# Patient Record
Sex: FEMALE | Race: WHITE | HISPANIC OR LATINO | Employment: UNEMPLOYED | URBAN - METROPOLITAN AREA
[De-identification: names, ages, dates, MRNs, and addresses within clinical notes are randomized per-mention and may not be internally consistent; named-entity substitution may affect disease eponyms.]

---

## 2022-05-17 PROBLEM — K21.9 GASTROESOPHAGEAL REFLUX DISEASE WITHOUT ESOPHAGITIS: Status: ACTIVE | Noted: 2022-05-17

## 2022-05-17 PROBLEM — E28.2 PCOS (POLYCYSTIC OVARIAN SYNDROME): Status: ACTIVE | Noted: 2022-05-17

## 2022-05-17 PROBLEM — J45.20 MILD INTERMITTENT ASTHMA WITHOUT COMPLICATION: Status: ACTIVE | Noted: 2022-05-17

## 2022-06-14 PROBLEM — J02.9 SORE THROAT: Status: ACTIVE | Noted: 2022-06-14

## 2022-06-14 PROBLEM — R22.43 LOCALIZED SWELLING, MASS, OR LUMP OF LOWER EXTREMITY, BILATERAL: Status: ACTIVE | Noted: 2022-06-14

## 2022-06-17 PROBLEM — R82.71 BACTERIURIA, CHRONIC: Status: ACTIVE | Noted: 2022-06-17

## 2022-08-25 ENCOUNTER — OFFICE VISIT (OUTPATIENT)
Dept: OBGYN CLINIC | Facility: CLINIC | Age: 22
End: 2022-08-25

## 2022-08-25 VITALS
HEIGHT: 61 IN | SYSTOLIC BLOOD PRESSURE: 128 MMHG | HEART RATE: 78 BPM | DIASTOLIC BLOOD PRESSURE: 72 MMHG | WEIGHT: 138 LBS | BODY MASS INDEX: 26.06 KG/M2

## 2022-08-25 DIAGNOSIS — Z31.69 INFERTILITY COUNSELING: ICD-10-CM

## 2022-08-25 DIAGNOSIS — E28.2 PCOS (POLYCYSTIC OVARIAN SYNDROME): ICD-10-CM

## 2022-08-25 DIAGNOSIS — R82.71 BACTERIURIA, CHRONIC: Primary | ICD-10-CM

## 2022-08-25 LAB
SL AMB  POCT GLUCOSE, UA: NORMAL
SL AMB LEUKOCYTE ESTERASE,UA: NORMAL
SL AMB POCT BILIRUBIN,UA: NORMAL
SL AMB POCT BLOOD,UA: NORMAL
SL AMB POCT CLARITY,UA: CLEAR
SL AMB POCT COLOR,UA: YELLOW
SL AMB POCT KETONES,UA: NORMAL
SL AMB POCT NITRITE,UA: NORMAL
SL AMB POCT PH,UA: 7
SL AMB POCT SPECIFIC GRAVITY,UA: 1.02
SL AMB POCT URINE PROTEIN: NORMAL
SL AMB POCT UROBILINOGEN: 0.2

## 2022-08-25 PROCEDURE — 87086 URINE CULTURE/COLONY COUNT: CPT

## 2022-08-25 PROCEDURE — 99213 OFFICE O/P EST LOW 20 MIN: CPT | Performed by: OBSTETRICS & GYNECOLOGY

## 2022-08-25 PROCEDURE — 87147 CULTURE TYPE IMMUNOLOGIC: CPT

## 2022-08-25 PROCEDURE — 81002 URINALYSIS NONAUTO W/O SCOPE: CPT | Performed by: OBSTETRICS & GYNECOLOGY

## 2022-08-25 RX ORDER — LETROZOLE 2.5 MG/1
2.5 TABLET, FILM COATED ORAL DAILY
Qty: 15 TABLET | Refills: 1 | Status: SHIPPED | OUTPATIENT
Start: 2022-08-25

## 2022-08-25 NOTE — PROGRESS NOTES
Clinic Visit   8/25/2022    SUBJECTIVE  HPI: Dante Metcalf is a 20 y o  G0 who presents for follow up of her PCOS, dysmenorrhea, and infertility  She brought a period diary from the last 2 months which showed her periods being about 18 days apart with about 9 days of bleeding and cramping throughout that time  States she has not bled since the 3rd of August and should be due to start soon  She has been sexually active with her partner without any form of birth control  Very much wants to become pregnant and is willing to continue dealing with the menstrual pain to do so  Did become tearful towards the end of our visit  History reviewed  No pertinent past medical history  History reviewed  No pertinent surgical history  Social History     Tobacco Use    Smoking status: Former Smoker    Smokeless tobacco: Never Used   Vaping Use    Vaping Use: Every day    Substances: Nicotine   Substance Use Topics    Alcohol use: Yes    Drug use: Never         Current Outpatient Medications:     letrozole (FEMARA) 2 5 mg tablet, Take 1 tablet (2 5 mg total) by mouth daily Take one tablet per day on days 3-7 of your cycle (day 1 is first day of bleeding) and repeat monthly, Disp: 15 tablet, Rfl: 1    metFORMIN (GLUCOPHAGE) 500 mg tablet, Take 2 tablets (1,000 mg total) by mouth daily with breakfast, Disp: 90 tablet, Rfl: 1    cetirizine (ZyrTEC) 10 mg tablet, Take 1 tablet (10 mg total) by mouth daily (Patient not taking: Reported on 8/25/2022), Disp: 30 tablet, Rfl: 0    famotidine (PEPCID) 20 mg tablet, Take 1 tablet (20 mg total) by mouth in the morning and 1 tablet (20 mg total) in the evening   (Patient not taking: Reported on 8/25/2022), Disp: 60 tablet, Rfl: 0    metFORMIN (GLUCOPHAGE) 500 mg tablet, Take 1 tablet (500 mg total) by mouth daily with breakfast (Patient not taking: Reported on 8/25/2022), Disp: 30 tablet, Rfl: 0      OBJECTIVE:  Vitals:    08/25/22 1144   BP: 128/72 Pulse: 78   Weight: 62 6 kg (138 lb)   Height: 5' 1" (1 549 m)       Physical Exam  GEN: The patient was alert and oriented x3, pleasant well-appearing female in no acute distress     CV: Regular rate  PULM: Non-labored respirations  MSK: Normal gait  Skin: Warm, dry  Neuro: No focal deficits  Psych: Normal affect and judgement, cooperative      ASSESSMENT/PLAN:    Problem List Items Addressed This Visit        Endocrine    PCOS (polycystic ovarian syndrome)     Has not taken her metformin recently, ran out of refills, will increase dose to 1000mg daily and send to pharmacy  No ultrasound done recently and [place], will repeat due to worsening pain         Relevant Medications    metFORMIN (GLUCOPHAGE) 500 mg tablet    Other Relevant Orders    POCT urine dip (Completed)    US pelvis complete w transvaginal    Urine culture       Other    Bacteriuria, chronic - Primary     Repeat urine culture sent today         Relevant Orders    Urine culture    Infertility counseling     SERENITY consult placed however unsure if covered by medical assistance  Will send partner to obtain a semen analysis, paper script given  Trial of letrozole ordered, patient instructed to take 2 5 mg on days 3-7 of her cycle  Plan to follow up in 2 months or sooner if needed         Relevant Medications    letrozole (FEMARA) 2 5 mg tablet    Other Relevant Orders    SEMEN ANALYSIS, Indiana Hebert MD   PGY-2, OBGYN  08/26/22 11:08 AM

## 2022-08-26 PROBLEM — Z31.69 INFERTILITY COUNSELING: Status: ACTIVE | Noted: 2022-08-26

## 2022-08-26 NOTE — ASSESSMENT & PLAN NOTE
Has not taken her metformin recently, ran out of refills, will increase dose to 1000mg daily and send to pharmacy  No ultrasound done recently and no record in this country, will repeat due to worsening pain

## 2022-08-26 NOTE — ASSESSMENT & PLAN NOTE
SERENITY consult placed however unsure if covered by medical assistance  Will send partner to obtain a semen analysis, paper script given  Trial of letrozole ordered, patient instructed to take 2 5 mg on days 3-7 of her cycle  Plan to follow up in 2 months or sooner if needed

## 2022-08-27 LAB
BACTERIA UR CULT: ABNORMAL
BACTERIA UR CULT: ABNORMAL

## 2022-11-24 ENCOUNTER — HOSPITAL ENCOUNTER (EMERGENCY)
Facility: HOSPITAL | Age: 22
Discharge: HOME | End: 2022-11-24
Attending: EMERGENCY MEDICINE

## 2022-11-24 VITALS
OXYGEN SATURATION: 100 % | BODY MASS INDEX: 27.87 KG/M2 | RESPIRATION RATE: 16 BRPM | HEART RATE: 78 BPM | TEMPERATURE: 98.6 F | SYSTOLIC BLOOD PRESSURE: 120 MMHG | WEIGHT: 147.5 LBS | DIASTOLIC BLOOD PRESSURE: 78 MMHG

## 2022-11-24 DIAGNOSIS — F41.9 ANXIETY: Primary | ICD-10-CM

## 2022-11-24 LAB
ATRIAL RATE: 95 BPM
EXT PREGNANCY TEST URINE: NEGATIVE
EXT. CONTROL: NORMAL
P AXIS: 61 DEGREES
PR INTERVAL: 140 MS
QRS AXIS: 64 DEGREES
QRSD INTERVAL: 92 MS
QT INTERVAL: 358 MS
QTC INTERVAL: 449 MS
T WAVE AXIS: 56 DEGREES
VENTRICULAR RATE: 95 BPM

## 2022-11-24 RX ORDER — HYDROXYZINE HYDROCHLORIDE 25 MG/1
25 TABLET, FILM COATED ORAL EVERY 6 HOURS
Qty: 12 TABLET | Refills: 0 | Status: SHIPPED | OUTPATIENT
Start: 2022-11-24

## 2022-11-24 RX ORDER — LORAZEPAM 0.5 MG/1
0.5 TABLET ORAL ONCE
Status: COMPLETED | OUTPATIENT
Start: 2022-11-24 | End: 2022-11-24

## 2022-11-24 RX ADMIN — LORAZEPAM 0.5 MG: 0.5 TABLET ORAL at 17:05

## 2022-11-24 NOTE — Clinical Note
Edda Keene was seen and treated in our emergency department on 11/24/2022  No restrictions            Diagnosis:     Osbelyn    She may return on this date: 11/25/2022         If you have any questions or concerns, please don't hesitate to call        Mickey Stallworth PA-C    ______________________________           _______________          _______________  Hospital Representative                              Date                                Time

## 2022-11-24 NOTE — ED PROVIDER NOTES
History  Chief Complaint   Patient presents with   • Chest Pain     Reports chest pain that started 1 hr PTA  Reports she is nervous and feels like her heart is beating fast       Patient is a 26-year-old female coming in for evaluation of heart palpitations, numbness of fingers and toes, as well as tachycardia  Patient has been feeling this way off and on for the last month  Patient has had several episodes today  Patient has no personal history of cardiac disease, diabetes, hypertension  Patient does not believe she is currently pregnant  On metformin for PCOS      History provided by:  Patient   used: No    Palpitations  Palpitations quality:  Regular  Onset quality:  Sudden  Timing:  Intermittent  Associated symptoms: near-syncope and numbness    Associated symptoms: no chest pain, no diaphoresis, no dizziness, no nausea, no shortness of breath and no vomiting    Risk factors: no diabetes mellitus and no heart disease        Prior to Admission Medications   Prescriptions Last Dose Informant Patient Reported? Taking? cetirizine (ZyrTEC) 10 mg tablet   No No   Sig: Take 1 tablet (10 mg total) by mouth daily   Patient not taking: Reported on 8/25/2022   famotidine (PEPCID) 20 mg tablet   No No   Sig: Take 1 tablet (20 mg total) by mouth in the morning and 1 tablet (20 mg total) in the evening  Patient not taking: Reported on 8/25/2022   letrozole [facility]) 2 5 mg tablet   No No   Sig: Take 1 tablet (2 5 mg total) by mouth daily Take one tablet per day on days 3-7 of your cycle (day 1 is first day of bleeding) and repeat monthly   metFORMIN (GLUCOPHAGE) 500 mg tablet   No No   Sig: Take 1 tablet (500 mg total) by mouth daily with breakfast   Patient not taking: Reported on 8/25/2022   metFORMIN (GLUCOPHAGE) 500 mg tablet   No No   Sig: Take 2 tablets (1,000 mg total) by mouth daily with breakfast      Facility-Administered Medications: None       History reviewed   No pertinent past medical history  History reviewed  No pertinent surgical history  History reviewed  No pertinent family history  I have reviewed and agree with the history as documented  E-Cigarette/Vaping   • E-Cigarette Use Current Every Day User      E-Cigarette/Vaping Substances   • Nicotine Yes      Social History     Tobacco Use   • Smoking status: Former   • Smokeless tobacco: Never   Vaping Use   • Vaping Use: Every day   • Substances: Nicotine   Substance Use Topics   • Alcohol use: Yes   • Drug use: Yes     Types: Marijuana       Review of Systems   Constitutional: Negative  Negative for diaphoresis  HENT: Negative  Eyes: Negative  Respiratory: Negative  Negative for chest tightness and shortness of breath  Cardiovascular: Positive for palpitations and near-syncope  Negative for chest pain  Gastrointestinal: Negative  Negative for abdominal pain, nausea and vomiting  Genitourinary: Negative  Musculoskeletal: Negative  Skin: Negative  Neurological: Positive for numbness  Negative for dizziness  Psychiatric/Behavioral: The patient is nervous/anxious  Physical Exam  Physical Exam  Vitals reviewed  Constitutional:       Appearance: Normal appearance  She is normal weight  HENT:      Head: Normocephalic and atraumatic  Right Ear: External ear normal       Left Ear: External ear normal       Nose: Nose normal    Eyes:      Conjunctiva/sclera: Conjunctivae normal    Cardiovascular:      Rate and Rhythm: Normal rate and regular rhythm  Pulmonary:      Effort: Pulmonary effort is normal       Breath sounds: Normal breath sounds  Abdominal:      Palpations: Abdomen is soft  Tenderness: There is no abdominal tenderness  There is no guarding  Musculoskeletal:         General: Normal range of motion  Cervical back: Normal range of motion  Skin:     General: Skin is warm and dry  Neurological:      Mental Status: She is alert           Vital Signs  ED Triage Vitals Temperature Pulse Respirations Blood Pressure SpO2   11/24/22 1646 11/24/22 1646 11/24/22 1646 11/24/22 1646 11/24/22 1646   98 6 °F (37 °C) (!) 112 20 136/87 100 %      Temp Source Heart Rate Source Patient Position - Orthostatic VS BP Location FiO2 (%)   11/24/22 1646 11/24/22 1705 11/24/22 1646 11/24/22 1646 --   Oral Monitor Lying Left arm       Pain Score       11/24/22 1705       2           Vitals:    11/24/22 1646 11/24/22 1705   BP: 136/87 120/78   Pulse: (!) 112 78   Patient Position - Orthostatic VS: Lying Sitting         Visual Acuity      ED Medications  Medications   LORazepam (ATIVAN) tablet 0 5 mg (0 5 mg Oral Given 11/24/22 1705)       Diagnostic Studies  Results Reviewed     Procedure Component Value Units Date/Time    POCT pregnancy, urine [[id number]]  (Normal) Resulted: 11/24/22 1702    Lab Status: Final result Updated: 11/24/22 1705     EXT Preg Test, Ur Negative     Control Valid                 No orders to display              Procedures  Procedures         ED Course  ED Course as of 11/24/22 1720   Thu Nov 24, 2022   1642 Ventricular rate 95 beats per minute  MS interval 140 milliseconds  QRS duration 92 milliseconds  QT//449 milliseconds  PRT axes 61, 64, 56,    ECG interpretation-"NSR, no ST elevation depression"                                 SBIRT 20yo+    Flowsheet Row Most Recent Value   SBIRT (25 yo +)    In order to provide better care to our patients, we are screening all of our patients for alcohol and drug use  Would it be okay to ask you these screening questions? No Filed at: 11/24/2022 1644                    MDM  Number of Diagnoses or Management Options  Anxiety: new and does not require workup  Diagnosis management comments: Patient is in no acute distress, comes in for evaluation of a panic attack  Patient given Ativan here, discharged home with Atarax, given information for outpatient counseling  No chest pain at this time, no history of heart disease    Patient discharged home with recommended follow-up to PCP  No ischemia or other concerning pathology on EKG    Counseling: I had a detailed discussion with the patient and/or guardian regarding: the historical points, exam findings, and any diagnostic results supporting the discharge diagnosis, lab results, radiology results, discharge instructions reviewed with patient and/or family/caregiver and understanding was verbalized  Instructions given to return to the emergency department if symptoms worsen or persist, or if there are any questions or concerns that arise at home       All labs reviewed and utilized in the medical decision making process     All radiology studies independently viewed by me and interpreted by the radiologist     Portions of the record may have been created with voice recognition software   Occasional wrong word or "sound a like" substitutions may have occurred due to the inherent limitations of voice recognition software   Read the chart carefully and recognize, using context, where substitutions have occurred  Risk of Complications, Morbidity, and/or Mortality  Presenting problems: minimal  Diagnostic procedures: minimal  Management options: minimal    Patient Progress  Patient progress: stable      Disposition  Final diagnoses:   Anxiety     Time reflects when diagnosis was documented in both MDM as applicable and the Disposition within this note     Time User Action Codes Description Comment    11/24/2022  4:56 PM Peter Neely Add [F41 9] Anxiety       ED Disposition     ED Disposition   Discharge    Condition   Stable    Date/Time   Thu Nov 24, 2022  4:56 PM    Comment   Pratima How discharge to home/self care                 Follow-up Information     Follow up With Specialties Details Why Contact Info Additional [facility] [address] Nurse Practitioner   [phone] [facility]  [place] [facility] [address]        [address] [facility] Emergency Medicine  As needed, If symptoms worsen [address] [id number] [address] [facility] Emergency Department          Discharge Medication List as of 11/24/2022  4:57 PM      START taking these medications    Details   hydrOXYzine HCL (ATARAX) 25 mg tablet Take 1 tablet (25 mg total) by mouth every 6 (six) hours, Starting Thu 11/24/2022, Normal         CONTINUE these medications which have NOT CHANGED    Details   cetirizine (ZyrTEC) 10 mg tablet Take 1 tablet (10 mg total) by mouth daily, Starting Tue 6/14/2022, Normal      famotidine (PEPCID) 20 mg tablet Take 1 tablet (20 mg total) by mouth in the morning and 1 tablet (20 mg total) in the evening , Starting Tue 5/17/2022, Normal      letrozole (FEMARA) 2 5 mg tablet Take 1 tablet (2 5 mg total) by mouth daily Take one tablet per day on days 3-7 of your cycle (day 1 is first day of bleeding) and repeat monthly, Starting Thu 8/25/2022, Normal      !! metFORMIN (GLUCOPHAGE) 500 mg tablet Take 1 tablet (500 mg total) by mouth daily with breakfast, Starting Tue 5/17/2022, Normal      !! metFORMIN (GLUCOPHAGE) 500 mg tablet Take 2 tablets (1,000 mg total) by mouth daily with breakfast, Starting Thu 8/25/2022, Normal       !! - Potential duplicate medications found  Please discuss with provider  No discharge procedures on file      PDMP Review     None          ED Provider  Electronically Signed by           Charity De Luna PA-C  11/24/22 7851

## 2022-12-15 ENCOUNTER — OFFICE VISIT (OUTPATIENT)
Dept: OBGYN CLINIC | Facility: CLINIC | Age: 22
End: 2022-12-15

## 2022-12-15 VITALS
HEIGHT: 61 IN | WEIGHT: 134 LBS | DIASTOLIC BLOOD PRESSURE: 82 MMHG | SYSTOLIC BLOOD PRESSURE: 128 MMHG | HEART RATE: 91 BPM | BODY MASS INDEX: 25.3 KG/M2

## 2022-12-15 DIAGNOSIS — Z31.69 INFERTILITY COUNSELING: ICD-10-CM

## 2022-12-15 DIAGNOSIS — E28.2 PCOS (POLYCYSTIC OVARIAN SYNDROME): Primary | ICD-10-CM

## 2022-12-15 NOTE — ASSESSMENT & PLAN NOTE
SERENITY consult placed however unsure if covered by medical assistance  Partner has not yet completed his semen analysis, paper script given  S/p 3 month course of letrozole, will switch to clomid and try another 3 months

## 2022-12-15 NOTE — ASSESSMENT & PLAN NOTE
On metformin 1000 mcg daily  Repeat pelvic US not yet obtained, given number for outpatient radiology to schedule

## 2022-12-15 NOTE — PROGRESS NOTES
Clinic Visit   12/15/2022    SUBJECTIVE  HPI: Jeanie Gandara is a 20 y o  G0 who presents for follow up of her PCOS, dysmenorrhea, and infertility  She reports that her periods have been more regular and she is currently getting them every month  Reports that her dysmenorrhea has continued but not worsened  She has continued to be sexually active with her partner without any form of birth control  History reviewed  No pertinent past medical history  History reviewed  No pertinent surgical history  Social History     Tobacco Use   • Smoking status: Former     Types: Cigarettes   • Smokeless tobacco: Never   Vaping Use   • Vaping Use: Every day   • Substances: Nicotine   Substance Use Topics   • Alcohol use: Yes     Comment: occ   • Drug use: Yes     Types: Marijuana         Current Outpatient Medications:   •  cetirizine (ZyrTEC) 10 mg tablet, Take 1 tablet (10 mg total) by mouth daily, Disp: 30 tablet, Rfl: 0  •  clomiPHENE (CLOMID) 50 mg tablet, Take 2 tabs daily for 5 days starting 5 days after your period Sis 2 pastillas por 5 mac  Sis la primera 5 mac despues del primer pinky de rae periodo  Repeat every month for 3 months  Repite cada mes por 3 meses  , Disp: 30 tablet, Rfl: 0  •  famotidine (PEPCID) 20 mg tablet, Take 1 tablet (20 mg total) by mouth in the morning and 1 tablet (20 mg total) in the evening , Disp: 60 tablet, Rfl: 0  •  hydrOXYzine HCL (ATARAX) 25 mg tablet, Take 1 tablet (25 mg total) by mouth every 6 (six) hours, Disp: 12 tablet, Rfl: 0  •  letrozole (FEMARA) 2 5 mg tablet, Take 1 tablet (2 5 mg total) by mouth daily Take one tablet per day on days 3-7 of your cycle (day 1 is first day of bleeding) and repeat monthly, Disp: 15 tablet, Rfl: 1  •  metFORMIN (GLUCOPHAGE) 500 mg tablet, Take 2 tablets (1,000 mg total) by mouth daily with breakfast, Disp: 90 tablet, Rfl: 1  •  metFORMIN (GLUCOPHAGE) 500 mg tablet, Take 1 tablet (500 mg total) by mouth daily with breakfast (Patient not taking: Reported on 8/25/2022), Disp: 30 tablet, Rfl: 0      OBJECTIVE:  Vitals:    12/15/22 1049   BP: 128/82   Pulse: 91   Weight: 60 8 kg (134 lb)   Height: 5' 1" (1 549 m)       Physical Exam  GEN: The patient was alert and oriented x3, pleasant well-appearing female in no acute distress     CV: Regular rate  PULM: Non-labored respirations  MSK: Normal gait  Skin: Warm, dry  Neuro: No focal deficits  Psych: Normal affect and judgement, cooperative    ASSESSMENT/PLAN:  Problem List Items Addressed This Visit        Endocrine    PCOS (polycystic ovarian syndrome) - Primary     On metformin 1000 mcg daily  Repeat pelvic US not yet obtained, given number for outpatient radiology to schedule            Other    Infertility counseling     SERENITY consult placed however unsure if covered by medical assistance  Partner has not yet completed his semen analysis, paper script given  S/p 3 month course of letrozole, will switch to clomid and try another 3 months         Relevant Medications    clomiPHENE (CLOMID) 50 mg tablet         Twin Sanchez MD   PGY-2, OBGYN  12/15/22 1:21 PM

## 2023-01-25 ENCOUNTER — HOSPITAL ENCOUNTER (OUTPATIENT)
Dept: ULTRASOUND IMAGING | Facility: HOSPITAL | Age: 23
Discharge: HOME | End: 2023-01-25

## 2023-01-25 DIAGNOSIS — E28.2 PCOS (POLYCYSTIC OVARIAN SYNDROME): ICD-10-CM

## 2023-01-25 NOTE — PROGRESS NOTES
Problem Visit   01/26/23     SUBJECTIVE:  HPI: Agus Collins is a 20 y o  Vanita Gums female who presents for follow up of PCOS and infertility  Last period was in November and she did take a course of Letrozole following that bleed but has not been able to since  Reports continued pelvic fullness and discomfort, likely secondary to her enlarged ovaries  Had a US done yesterday which showed innumerable follicles in both ovaries with a normal uterus and 6mm lining  History reviewed  No pertinent surgical history  Social History     Tobacco Use   • Smoking status: Former     Types: Cigarettes   • Smokeless tobacco: Never   Vaping Use   • Vaping Use: Every day   • Substances: Nicotine   Substance Use Topics   • Alcohol use: Not Currently     Comment: occ   • Drug use: Not Currently     Types: Marijuana         Current Outpatient Medications:   •  letrozole (FEMARA) 2 5 mg tablet, Take 2 tablets (5 mg total) by mouth daily for 15 days Take one tablet per day on days 3-7 of your cycle (day 1 is first day of bleeding) and repeat monthly, Disp: 30 tablet, Rfl: 0  •  Prenatal 27-1 MG TABS, Take 1 tablet by mouth in the morning, Disp: 30 tablet, Rfl: 0  •  cetirizine (ZyrTEC) 10 mg tablet, Take 1 tablet (10 mg total) by mouth daily (Patient not taking: Reported on 1/26/2023), Disp: 30 tablet, Rfl: 0  •  famotidine (PEPCID) 20 mg tablet, Take 1 tablet (20 mg total) by mouth in the morning and 1 tablet (20 mg total) in the evening   (Patient not taking: Reported on 1/26/2023), Disp: 60 tablet, Rfl: 0  •  hydrOXYzine HCL (ATARAX) 25 mg tablet, Take 1 tablet (25 mg total) by mouth every 6 (six) hours (Patient not taking: Reported on 1/26/2023), Disp: 12 tablet, Rfl: 0  •  metFORMIN (GLUCOPHAGE) 500 mg tablet, Take 2 tablets (1,000 mg total) by mouth daily with breakfast (Patient not taking: Reported on 1/26/2023), Disp: 90 tablet, Rfl: 1      OBJECTIVE:  Vitals:    01/26/23 0834   BP: 108/69   Pulse: 83 Weight: 60 3 kg (133 lb)   Height: 5' 1" (1 549 m)       Physical Exam  GEN: The patient was alert and oriented x3, pleasant well-appearing female in no acute distress     CV: Regular rate  PULM: Non-labored respirations  MSK: Normal gait  Skin: Warm, dry  Neuro: No focal deficits  Psych: Normal affect and judgement, cooperative      ASSESSMENT/PLAN:  Problem List        Digestive    Gastroesophageal reflux disease without esophagitis       Endocrine    PCOS (polycystic ovarian syndrome)    Overview     · Diagnosed in [facility] with labs and ultrasound, confirmed innumerable cysts on bilateral ovaries seen on repeat US 1/25/23  · Periods are irregular in both frequency and length  · Previously took metformin but no longer, can consider checking insulin level and lipids to determine if it would be beneficial          Current Assessment & Plan     Has not had a period since November, did one cycle of letrozole following that bleed  Urine pregnancy test in the office today was negative, thin EMS seen on [facility] yesterday  Will follow up estradiol/progesterone levels and call patient to start taking the letrozole  Will order repeat progesterone level to be drawn around day 25 and increase the dose to 7 5 mg if still low  If unable to conceive after multiple cycles of this, will then suggest SIS vs HSG to check for tubal patency            Respiratory    Mild intermittent asthma without complication       Genitourinary    Infertility associated with anovulation    Overview     · Have been trying to get pregnant for the last 2 years  · Partner previously resistant to sperm analysis since he has fathered kids in the past, again emphasized that he still must complete it to be sure  · Will try letrozole (see A&P)  · If unsuccessful after increasing to max dose, will then pursue HSG to check for tubal patency           Current Assessment & Plan     - We discussed potential advantages of letrozole over a clomid challenge for PCOS  - Letrozole now considered to be the drug of choice for ovulation induction in women with PCOS  Clomiphene citrate has been the first-line drug for this population for many years, with metformin used as an alternative  However, both clomiphene and metformin appear to be less effective for live birth rates than letrozole  - No direct antiestrogenic adverse effects on the endometrium, due to an absence of peripheral estrogen receptor blockade and the shorter half-life  - She increase the dose of letrozole to 5 mg x 5 days on or about the fifth day of cycle (once confirmed by lab work)      Protocol:  1  Endometrial stripe 6 mm, estradiol and progesterone levels ordered  If within range, will consider this Day 1   2  Take Letrozole as prescribed on Day 3-7 of your cycle  You may have bloating, cramping, nausea due to the stimulation of your ovaries  3  Have intercourse approximately every other day around Day 10-16  For increased accuracy, test for an [facility] surge using an Ovulation Predictor Kit (OPK)  Usually ovulation will take place around day 14  Plan to have intercourse on the day you have a +LH on the OPK  4  Go to the lab on Day 21 for a Progesterone level  This will tell me if you have ovulated  5  Take a pregnancy test on Day 30 if no menses  If positive call, the office for appointment scheduling  If negative and no menses- wait until day 35  If still no menses and pregnancy test is negative call the office  6  If you get a period, the first day of bleeding is Day 1 and the cycle restarts              Other    Sore throat    Localized swelling, mass, or lump of lower extremity, bilateral         Noah Albert MD   PGY-2, OBGYN  01/26/23 10:07 AM

## 2023-01-26 ENCOUNTER — OFFICE VISIT (OUTPATIENT)
Dept: OBGYN CLINIC | Facility: CLINIC | Age: 23
End: 2023-01-26

## 2023-01-26 ENCOUNTER — APPOINTMENT (OUTPATIENT)
Dept: LAB | Facility: CLINIC | Age: 23
End: 2023-01-26

## 2023-01-26 VITALS
HEIGHT: 61 IN | BODY MASS INDEX: 25.11 KG/M2 | DIASTOLIC BLOOD PRESSURE: 69 MMHG | WEIGHT: 133 LBS | SYSTOLIC BLOOD PRESSURE: 108 MMHG | HEART RATE: 83 BPM

## 2023-01-26 DIAGNOSIS — N97.0 INFERTILITY ASSOCIATED WITH ANOVULATION: ICD-10-CM

## 2023-01-26 DIAGNOSIS — N97.0 INFERTILITY ASSOCIATED WITH ANOVULATION: Primary | ICD-10-CM

## 2023-01-26 DIAGNOSIS — E28.2 PCOS (POLYCYSTIC OVARIAN SYNDROME): Primary | ICD-10-CM

## 2023-01-26 DIAGNOSIS — Z31.69 INFERTILITY COUNSELING: ICD-10-CM

## 2023-01-26 PROBLEM — R82.71 BACTERIURIA, CHRONIC: Status: RESOLVED | Noted: 2022-06-17 | Resolved: 2023-01-26

## 2023-01-26 LAB
ESTRADIOL SERPL-MCNC: 49 PG/ML
PROGEST SERPL-MCNC: 1.1 NG/ML
SL AMB POCT URINE HCG: NEGATIVE

## 2023-01-26 RX ORDER — LETROZOLE 2.5 MG/1
5 TABLET, FILM COATED ORAL DAILY
Qty: 30 TABLET | Refills: 0 | Status: SHIPPED | OUTPATIENT
Start: 2023-01-26 | End: 2023-02-10

## 2023-01-26 RX ORDER — MEDROXYPROGESTERONE ACETATE 10 MG/1
10 TABLET ORAL DAILY
Qty: 5 TABLET | Refills: 0 | Status: CANCELLED | OUTPATIENT
Start: 2023-01-26 | End: 2023-01-31

## 2023-01-26 RX ORDER — PRENATAL WITH FERROUS FUM AND FOLIC ACID 3080; 920; 120; 400; 22; 1.84; 3; 20; 10; 1; 12; 200; 27; 25; 2 [IU]/1; [IU]/1; MG/1; [IU]/1; MG/1; MG/1; MG/1; MG/1; MG/1; MG/1; UG/1; MG/1; MG/1; MG/1; MG/1
1 TABLET ORAL DAILY
Qty: 30 TABLET | Refills: 0 | Status: SHIPPED | OUTPATIENT
Start: 2023-01-26

## 2023-01-26 RX ORDER — LETROZOLE 2.5 MG/1
5 TABLET, FILM COATED ORAL DAILY
Qty: 30 TABLET | Refills: 0 | Status: CANCELLED | OUTPATIENT
Start: 2023-01-26

## 2023-01-26 NOTE — ASSESSMENT & PLAN NOTE
Has not had a period since November, did one cycle of letrozole following that bleed  Urine pregnancy test in the office today was negative, thin EMS seen on [facility] yesterday  Will follow up estradiol/progesterone levels and call patient to start taking the letrozole  Will order repeat progesterone level to be drawn around day 25 and increase the dose to 7 5 mg if still low  If unable to conceive after multiple cycles of this, will then suggest SIS vs HSG to check for tubal patency

## 2023-01-26 NOTE — ASSESSMENT & PLAN NOTE
- We discussed potential advantages of letrozole over a clomid challenge for PCOS  - Letrozole now considered to be the drug of choice for ovulation induction in women with PCOS  Clomiphene citrate has been the first-line drug for this population for many years, with metformin used as an alternative  However, both clomiphene and metformin appear to be less effective for live birth rates than letrozole  - No direct antiestrogenic adverse effects on the endometrium, due to an absence of peripheral estrogen receptor blockade and the shorter half-life  - She increase the dose of letrozole to 5 mg x 5 days on or about the fifth day of cycle (once confirmed by lab work)      Protocol:  1  Endometrial stripe 6 mm, estradiol and progesterone levels ordered  If within range, will consider this Day 1   2  Take Letrozole as prescribed on Day 3-7 of your cycle  You may have bloating, cramping, nausea due to the stimulation of your ovaries  3  Have intercourse approximately every other day around Day 10-16  For increased accuracy, test for an [facility] surge using an Ovulation Predictor Kit (OPK)  Usually ovulation will take place around day 14  Plan to have intercourse on the day you have a +LH on the OPK  4  Go to the lab on Day 21 for a Progesterone level  This will tell me if you have ovulated  5  Take a pregnancy test on Day 30 if no menses  If positive call, the office for appointment scheduling  If negative and no menses- wait until day 35  If still no menses and pregnancy test is negative call the office  6  If you get a period, the first day of bleeding is Day 1 and the cycle restarts

## 2023-02-22 ENCOUNTER — APPOINTMENT (OUTPATIENT)
Dept: LAB | Facility: HOSPITAL | Age: 23
End: 2023-02-22

## 2023-02-22 DIAGNOSIS — N97.0 INFERTILITY ASSOCIATED WITH ANOVULATION: ICD-10-CM

## 2023-02-22 LAB — PROGEST SERPL-MCNC: 1.2 NG/ML

## 2023-02-23 ENCOUNTER — TELEPHONE (OUTPATIENT)
Dept: OBGYN CLINIC | Facility: CLINIC | Age: 23
End: 2023-02-23

## 2023-02-23 NOTE — TELEPHONE ENCOUNTER
Spoke with with patient after provider had reviewed her results  Advised patient that progesterone level did not rise, meaning she did not ovulate  If she didn't experience any bleeding in 1 week she is to repeat medication and call to have another lab placed for progesterone   Patient stated understanding and had no questions

## 2023-02-24 DIAGNOSIS — Z31.69 INFERTILITY COUNSELING: ICD-10-CM

## 2023-02-24 RX ORDER — LETROZOLE 2.5 MG/1
TABLET, FILM COATED ORAL
Qty: 30 TABLET | Refills: 0 | Status: SHIPPED | OUTPATIENT
Start: 2023-02-24

## 2023-03-07 DIAGNOSIS — N97.0 INFERTILITY ASSOCIATED WITH ANOVULATION: Primary | ICD-10-CM

## 2023-03-30 ENCOUNTER — APPOINTMENT (OUTPATIENT)
Dept: LAB | Facility: HOSPITAL | Age: 23
End: 2023-03-30

## 2023-03-30 DIAGNOSIS — N97.0 INFERTILITY ASSOCIATED WITH ANOVULATION: ICD-10-CM

## 2023-03-30 LAB — PROGEST SERPL-MCNC: 2.3 NG/ML

## 2023-11-07 NOTE — PROGRESS NOTES
OB/GYN VISIT  [address]  2023  3:20 PM    ASSESSMENT / PLAN:    Judy Chacon is a 24 y.o.  female presenting for 2 months of vaginal bleeding and right breast mass and pain. Problem List       Endocrine    PCOS (polycystic ovarian syndrome)    Overview     Diagnosed in [place] with labs and ultrasound, confirmed innumerable cysts on bilateral ovaries seen on repeat US 23  Periods are irregular in both frequency and length  Previously took metformin but no longer, can consider checking insulin level and lipids to determine if it would be beneficial.         Abnormal uterine bleeding (AUB)    Overview     2 months of constant vaginal bleeding starting in early 2023. Known PCOS with history of irregular periods. Pregnancy test negative 23. Labs ordered  CBC in setting of persistent bleeding with intermittent lightheadedness  HbA1c & lipid panel in setting of known PCOS with no labs in over 1 year  TSH & prolactin  Pelvic US ordered  Follow up 2 weeks after completion of US & labs to discuss results         Mass of right breast    Overview     1 cm nodular mobile mass of right breast about 3 cm from areola at 4 o'clock. Right breast US ordered  Follow up 2 weeks after US to discuss results          SUBJECTIVE:    Judy Chacon is a 24 y.o.  female presenting for 2 months of bleeding. She has known PCOS and has previously been on OCPs and Metformin but currently is not taking any medications and hasn't for "many months". She had highly irregular periods before this current bleeding episode, sometimes going months at a time with no bleeding. Since early September, she has bled "like a period" without interruption. She occasionally will feel lightheaded but denies chest pain, SOB, dizziness, or syncope. She has no recent changes to her weight or health in general other than this bleeding.   She occasionally feels pelvic pain (left worse than right) that is not worse with bowel movements, urinating, or intercourse. We discussed plan for pelvic US and labs with follow up after completion to determine management moving forward. She also reports bilateral breast pain for 4-5 months which is diffuse and mild but worse in her upper inner right breast where she noticed a grape sized mass several moths ago. Since noticing this mass, it has not changed. Her pain is a little worse with her period but remains between periods as well. She denies nipple discharge or injury/trauma to the area. We discussed plan for right breast US with follow up after completion to determine management moving forward. Past Medical History:   Diagnosis Date    Bacteriuria, chronic 06/17/2022    Citrobacter on urine culture, treated, symptoms persistent    PCOS (polycystic ovarian syndrome)        History reviewed. No pertinent surgical history. OBJECTIVE:    Vitals:  Blood pressure 109/76, pulse 82, resp. rate 18, height 5' 1" (1.549 m), weight 60.7 kg (133 lb 12.8 oz). Body mass index is 25.28 kg/m². Physical Exam:    Physical Exam  Constitutional:       General: She is not in acute distress. Appearance: Normal appearance. She is not ill-appearing. Genitourinary:      Urethral meatus normal.      No lesions in the vagina. Genitourinary Comments: Mild diffuse tenderness on bimanual exam equal throughout though no CMT noted      Right Labia: No rash, tenderness, lesions, skin changes or Bartholin's cyst.     Left Labia: No tenderness, lesions, skin changes, Bartholin's cyst or rash. No labial fusion noted. Pelvic Alexei Score: 5/5. No vaginal discharge, erythema, tenderness, bleeding, ulceration or granulation tissue. No vaginal prolapse present. No vaginal atrophy present. Right Adnexa: tender. Right Adnexa: not full, not palpable, no mass present and not absent. Left Adnexa: tender. Left Adnexa: not full, not palpable, no mass present and not absent. Cervix is nulliparous. No cervical motion tenderness, discharge, friability, lesion, polyp, nabothian cyst, eversion or elongation. Uterus is tender. Uterus is not enlarged, fixed, irregular or prolapsed. No uterine mass detected. Breasts: Alexei Score is 5. Breasts are symmetrical.      Breasts are soft. Right: Mass (1 cm nodular mobile mass in upper inner right breast approximately 3 cm from areola at 4 o'clock) and tenderness (mild point tenderness of mass noted above) present. No swelling, bleeding, inverted nipple, nipple discharge or skin change. Left: No swelling, bleeding, inverted nipple, mass, nipple discharge, skin change or tenderness. HENT:      Mouth/Throat:      Mouth: Mucous membranes are moist.   Eyes:      Extraocular Movements: Extraocular movements intact. Cardiovascular:      Rate and Rhythm: Normal rate. Pulmonary:      Effort: Pulmonary effort is normal.   Abdominal:      General: There is no distension. Palpations: Abdomen is soft. Tenderness: There is no abdominal tenderness. There is no guarding. Musculoskeletal:         General: No swelling or tenderness. Right lower leg: No edema. Left lower leg: No edema. Lymphadenopathy:      Upper Body:      Right upper body: No supraclavicular or axillary adenopathy. Left upper body: No supraclavicular or axillary adenopathy. Neurological:      General: No focal deficit present. Mental Status: She is alert. Skin:     General: Skin is warm and dry. Coloration: Skin is not jaundiced or pale. Psychiatric:         Mood and Affect: Mood normal.         Behavior: Behavior normal.         Thought Content:  Thought content normal.         Judgment: Judgment normal.           Enzo Blair MD  11/8/2023  3:20 PM

## 2023-11-08 ENCOUNTER — OFFICE VISIT (OUTPATIENT)
Dept: OBGYN CLINIC | Facility: CLINIC | Age: 23
End: 2023-11-08

## 2023-11-08 VITALS
WEIGHT: 133.8 LBS | HEART RATE: 82 BPM | RESPIRATION RATE: 18 BRPM | SYSTOLIC BLOOD PRESSURE: 109 MMHG | HEIGHT: 61 IN | DIASTOLIC BLOOD PRESSURE: 76 MMHG | BODY MASS INDEX: 25.26 KG/M2

## 2023-11-08 DIAGNOSIS — N93.9 ABNORMAL UTERINE BLEEDING (AUB): Primary | ICD-10-CM

## 2023-11-08 DIAGNOSIS — N63.10 MASS OF RIGHT BREAST, UNSPECIFIED QUADRANT: ICD-10-CM

## 2023-11-08 DIAGNOSIS — Z32.02 PREGNANCY TEST NEGATIVE: ICD-10-CM

## 2023-11-08 LAB — SL AMB POCT URINE HCG: NEGATIVE

## 2023-11-08 PROCEDURE — 99213 OFFICE O/P EST LOW 20 MIN: CPT | Performed by: OBSTETRICS & GYNECOLOGY

## 2023-11-08 PROCEDURE — 81025 URINE PREGNANCY TEST: CPT | Performed by: OBSTETRICS & GYNECOLOGY
